# Patient Record
(demographics unavailable — no encounter records)

---

## 2024-11-18 NOTE — ASSESSMENT
[FreeTextEntry1] : 60 y/o F with prior hx of stroke, prior cocaine use, HTN, HLD, DM, OA, CKD Stage III, Schizophrenia here to establish care for prior hx of stroke and to obtain neuro clearance for upcoming Ortho surgery. Records reviewed and patient was diagnosed with a Left corona radiata infarct at [facility] in July 2023.  Vascular imaging was unremarkeable.  She is currently on DAPT and statin.    Based on records, presume stroke to be small vessel disease.    PLAN: Ischemic Stroke, Left corona radiata July 2023 - Continue Monotherapy with Aspirin alone - Cont. Lipitor 80mg - A1c 5.8 - PT referral for balance training. May benefit from assistive device  Follow up in 6 months  We spoke about the importance of lifestyle factors including refraining from tobacco use, diet (Mediterranean diet) that emphasizes vegetables, fruits, and whole grains and includes low-fat dairy products, poultry, fish, legumes, olive oil, and nuts while limiting intake of sweets and red meats, moderate- to vigorous-intensity aerobic physical exercise lasting at least 40 minutes 3-4 times per week, adequate glucose control, medication compliance, and consistent outpatient follow-up for monitoring of blood pressure, glucose levels and lipids with a goal blood pressure under 140/90, hemoglobin A1c < 7.0 and goal LDL under 70 that will help in reducing future stroke risk. We discussed the importance of adequate hydration and making sure to drink eight eight-ounce glasses of water daily. We also discussed general symptoms that should prompt immediate presentation to the emergency department for evaluation of acute stroke including: sudden onset of focal weakness, numbness, difficulty with speech production or comprehension, slurred speech, visual changes, gait imbalance, and/or sudden/severe headache.
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
## 2024-11-18 NOTE — PHYSICAL EXAM
[FreeTextEntry1] : General: Cooperative, NAD HEENT: NC/AT, no carotid bruits Lungs: CTAB Chest: RRR, no murmurs Extremities: nontender, no erythema Neurological Examination: NIHSS: 5 MS: AOx3. Appropriately interactive, normal affect. Speech fluent w/o paraphasic errors, but is moderately dysarthric CN: PERLL, EOMI, V1-3 sensation intact, R facial droop, moderately dysarthric, hearing intact, palate elevates symmetrically, tongue midline, SCM equal bilaterally Motor: normal bulk and tone, no tremor, rigidity or bradykinesia. 5/5 all over on the left, RUE and RLE are 4/5 with mildly increased tone Sens: Intact to light touch. Reflexes: 2/4 all over, downgoing toes b/l Coord: No dysmetria, slow FFM on Right side Gait: Ambulates independently, short steps   Exam same as before--reviewed and assessed--no changes

## 2025-03-24 NOTE — CONSULT LETTER
[Dear  ___] : Dear  [unfilled], [FreeTextEntry1] : I had the pleasure of evaluating your patient, CITLALLI MOBLEY , in the office today.  Please review my consultation and evaluation report that follows below.  Please do not hesitate to call me if further information is necessary or if you wish to discuss ongoing care or diagnostic work-up.    I very much appreciate your referral and it is a privilege to be able to provide care for your patient.  Sincerely,   Tunde Conteh MD, MHCM, FACP, NADER-C Pulmonary Medicine  of Medicine [facility] [employer] at [facility] [email or web address] -Pulmonary in [address] [place]  [address]    Fax   [facility] at [address]  [phone]

## 2025-03-24 NOTE — ASSESSMENT
[FreeTextEntry1] : 60 yo woman comes referred by her Group home Accompanied by aid Info provided on the telephone by Kristine,  Has been on INH 300mg OD for treatment of latent TB for last six months They wish to know if they can distinue treatment--she has tolerated it well Also, patient has been a smoker for many years--exact amount unknown but she still smokes and vapes Never had LCS as far as can be ascertained The chart says she takes Dulera but she does not confirm taking it Also, using nicotine gum as per chart but uncertain if she uses it  She denies cough, sputum, dyspnea, pneumonia Had CVA in July 2023 and followed by neuro, dysarthria which has persisted s/p knee arthroplasty in March 2024  CT report (no films ) from [facility] in July 2024: Nonspecific right hilar lymph node Multiple, uncharged, Right lung nodes apparently not changed from a previous CT noted by the radiologist  Imp  60 yo woman s/p CVA with dysarthria, at group home Smoker for many years and still smoking Hx of Latent TB and s/p 6 months of --she has received adequate treatment and may stop Possible mild COPD , recommend PFTs Also, will get followup CT scan in July 2025, one year after last scan. Retrun for PFTs after the CT

## 2025-03-24 NOTE — HISTORY OF PRESENT ILLNESS
[TextBox_4] : 60 yo woman comes referred by her Group home Accompanied by aid Info provided on the telephone by Kristine,  Has been on INH 300mg OD for treatment of latent TB for last six months They wish to know if they can distinue treatment--she has tolerated it well Also, patient has been a smoker for many years--exact amount unknown but she still smokes and vapes Never had LCS as far as can be ascertained The chart says she takes Dulera but she does not confirm taking it Also, using nicotine gum as per chart but uncertain if she uses it  She denies cough, sputum, dyspnea, pneumonia Had CVA in July 2023 and followed by neuro, dysarthria which has persisted s/p knee arthroplasty in March 2024  CT report (no films ) from [facility] in July 2024: Nonspecific right hilar lymph node Multiple, uncharged, Right lung nodes apparently not changed from a previous CT noted by the radiologist  She is on topiramte and quetiapine and naltrexone

## 2025-06-13 NOTE — ASSESSMENT
[FreeTextEntry1] : 62 y/o F with prior hx of stroke, prior cocaine use, HTN, HLD, DM, OA, CKD Stage III, Schizophrenia here to establish care for prior hx of stroke and to obtain neuro clearance for upcoming Ortho surgery. Records reviewed and patient was diagnosed with a Left corona radiata infarct at [facility] in July 2023.  Vascular imaging was unremarkeable.  She is currently on DAPT and statin.    Based on records, presume stroke to be small vessel disease.    PLAN: Ischemic Stroke, Left corona radiata July 2023 - Continue Monotherapy with Aspirin alone - Cont. Lipitor 80mg - A1c 5.8 - PT referral for balance training and fall prevention  Follow up in 6 months  We spoke about the importance of lifestyle factors including refraining from tobacco use, diet (Mediterranean diet) that emphasizes vegetables, fruits, and whole grains and includes low-fat dairy products, poultry, fish, legumes, olive oil, and nuts while limiting intake of sweets and red meats, moderate- to vigorous-intensity aerobic physical exercise lasting at least 40 minutes 3-4 times per week, adequate glucose control, medication compliance, and consistent outpatient follow-up for monitoring of blood pressure, glucose levels and lipids with a goal blood pressure under 140/90, hemoglobin A1c < 7.0 and goal LDL under 70 that will help in reducing future stroke risk. We discussed the importance of adequate hydration and making sure to drink eight eight-ounce glasses of water daily. We also discussed general symptoms that should prompt immediate presentation to the emergency department for evaluation of acute stroke including: sudden onset of focal weakness, numbness, difficulty with speech production or comprehension, slurred speech, visual changes, gait imbalance, and/or sudden/severe headache.

## 2025-06-13 NOTE — PHYSICAL EXAM
[FreeTextEntry1] : General: Cooperative, NAD HEENT: NC/AT, no carotid bruits Lungs: CTAB Chest: RRR, no murmurs Extremities: nontender, no erythema Neurological Examination: NIHSS: 5 MS: AOx3. Appropriately interactive, normal affect. Speech fluent w/o paraphasic errors, but is moderately dysarthric CN: PERLL, EOMI, V1-3 sensation intact, R facial droop, moderately dysarthric, hearing intact, palate elevates symmetrically, tongue midline, SCM equal bilaterally Motor: normal bulk and tone, no tremor, rigidity or bradykinesia. 5/5 all over on the left, RUE and RLE are 4/5 with mildly increased tone Sens: Intact to light touch. Reflexes: 2/4 all over, downgoing toes b/l Coord: No dysmetria, slow FFM on Right side Gait: Ambulates with cane, short steps   Exam same as before--reviewed and assessed--no changes

## 2025-07-17 NOTE — DISCUSSION/SUMMARY
[Medication Risks Reviewed] : Medication risks reviewed [Surgical risks reviewed] : Surgical risks reviewed [de-identified] : Patient is a 62-year-old female with left greater trochanteric bursitis presenting today for initial evaluation.  I recommended conservative treatment at this time.  We discussed 1 plan to taper 's.  Recommend physical therapy.-Prescription for diclofenac gel.  I have given her prescription for an ultrasound-guided cortisone injection to her left greater trochanteric bursa.  I will see her back on an as-needed basis.  All questions were asked and answered

## 2025-07-17 NOTE — PHYSICAL EXAM
[de-identified] : left hip exam showed no groin pain with SLR, ROM is full flexion with 70 degrees of external rotation and 30 degrees of internal rotation without pain, KALEY negative, FADIR negative. Tenderness palpation over the greater trochanter.  5/5 motor strength in bilateral lower extremities. Sensory: Intact in bilateral lower extremities.     [de-identified] : AP pelvis and 2 views of the left hip obtained the office today show no acute fracture or dislocation.  No significant degenerative changes noted

## 2025-07-17 NOTE — ADDENDUM
[FreeTextEntry1] : This note was written by Layne Aquino, acting as the  for Dr. Ambrocio on 07/17/2025. This note accurately reflects the work and decisions made by Dr. Ambrocio.

## 2025-07-17 NOTE — HISTORY OF PRESENT ILLNESS
[FreeTextEntry1] : Appears to have exacerbation of asthma triggered by viral illness. Recommend short course of oral steroids and use of rescue inhaler. [de-identified] : This is a 62 year old F pt presents today for a evaluation of left hip pain. The pain has been there for 4-5 months without injury. Reports a fall about 2 weeks ago. No prior history of hip pain. Pt is ambulating without use of any assistance device. She presents today with chief complaint of intermittent, moderate dull aching pain around the lateral aspect of the hip.  No radicular pain or paresthesia. Pt denies numbness or tingling. Has sharp pains while laying on the area, navigating with stairs, and during daily activities. No prior history of injection, surgical intervention, or physical therapy noted. No constitutional symptoms noted.  Past medical history of hyperlipidemia, paranoid schizophrenia, substance abuse disorder